# Patient Record
Sex: MALE | URBAN - METROPOLITAN AREA
[De-identification: names, ages, dates, MRNs, and addresses within clinical notes are randomized per-mention and may not be internally consistent; named-entity substitution may affect disease eponyms.]

---

## 2020-08-06 ENCOUNTER — OFFICE VISIT (OUTPATIENT)
Dept: URBAN - METROPOLITAN AREA CLINIC 19 | Facility: CLINIC | Age: 50
End: 2020-08-06
Payer: COMMERCIAL

## 2020-08-06 DIAGNOSIS — K85.00 IDIOPATHIC ACUTE PANCREATITIS WITHOUT INFECTION OR NECROSIS: ICD-10-CM

## 2020-08-06 DIAGNOSIS — K62.5 RECTAL BLEEDING: ICD-10-CM

## 2020-08-06 PROCEDURE — 99244 OFF/OP CNSLTJ NEW/EST MOD 40: CPT | Performed by: INTERNAL MEDICINE

## 2020-08-06 PROCEDURE — 99204 OFFICE O/P NEW MOD 45 MIN: CPT | Performed by: INTERNAL MEDICINE

## 2020-08-07 LAB
A/G RATIO: 1.5
ALBUMIN: 4.4
ALKALINE PHOSPHATASE: 86
ALT (SGPT): 124
AST (SGOT): 59
BILIRUBIN, TOTAL: 0.4
BUN/CREATININE RATIO: 12
BUN: 12
CALCIUM: 9.8
CARBON DIOXIDE, TOTAL: 20
CHLORIDE: 97
CHOLESTEROL, TOTAL: 294
COMMENT:: (no result)
CREATININE: 0.99
EGFR IF AFRICN AM: 103
EGFR IF NONAFRICN AM: 89
GLOBULIN, TOTAL: 3
GLUCOSE: 140
HDL CHOLESTEROL: 41
HEMATOCRIT: 49.1
HEMOGLOBIN: 16.5
LDL CHOLESTEROL CALC: 202
MCH: 31.7
MCHC: 33.6
MCV: 94
NRBC: (no result)
PLATELETS: 320
POTASSIUM: 4.6
PROTEIN, TOTAL: 7.4
RBC: 5.21
RDW: 12.1
SODIUM: 136
TRIGLYCERIDES: 256
VLDL CHOLESTEROL CAL: 51
WBC: 10.5

## 2020-08-21 ENCOUNTER — LAB OUTSIDE AN ENCOUNTER (OUTPATIENT)
Dept: URBAN - METROPOLITAN AREA CLINIC 19 | Facility: CLINIC | Age: 50
End: 2020-08-21

## 2020-08-25 ENCOUNTER — OFFICE VISIT (OUTPATIENT)
Dept: URBAN - METROPOLITAN AREA LAB 2 | Facility: LAB | Age: 50
End: 2020-08-25

## 2020-11-06 ENCOUNTER — OFFICE VISIT (OUTPATIENT)
Dept: URBAN - METROPOLITAN AREA SURGERY CENTER 31 | Facility: SURGERY CENTER | Age: 50
End: 2020-11-06

## 2021-01-22 ENCOUNTER — TELEPHONE ENCOUNTER (OUTPATIENT)
Dept: URBAN - METROPOLITAN AREA CLINIC 92 | Facility: CLINIC | Age: 51
End: 2021-01-22

## 2021-01-22 ENCOUNTER — CLAIMS CREATED FROM THE CLAIM WINDOW (OUTPATIENT)
Dept: URBAN - METROPOLITAN AREA CLINIC 4 | Facility: CLINIC | Age: 51
End: 2021-01-22
Payer: COMMERCIAL

## 2021-01-22 ENCOUNTER — OFFICE VISIT (OUTPATIENT)
Dept: URBAN - METROPOLITAN AREA SURGERY CENTER 31 | Facility: SURGERY CENTER | Age: 51
End: 2021-01-22
Payer: COMMERCIAL

## 2021-01-22 DIAGNOSIS — D12.4 ADENOMA OF DESCENDING COLON: ICD-10-CM

## 2021-01-22 DIAGNOSIS — D12.5 ADENOMA OF SIGMOID COLON: ICD-10-CM

## 2021-01-22 DIAGNOSIS — D12.4 BENIGN NEOPLASM OF DESCENDING COLON: ICD-10-CM

## 2021-01-22 DIAGNOSIS — D12.5 BENIGN NEOPLASM OF SIGMOID COLON: ICD-10-CM

## 2021-01-22 DIAGNOSIS — K92.1 BLACK STOOL: ICD-10-CM

## 2021-01-22 PROCEDURE — 45385 COLONOSCOPY W/LESION REMOVAL: CPT | Performed by: INTERNAL MEDICINE

## 2021-01-22 PROCEDURE — 45381 COLONOSCOPY SUBMUCOUS NJX: CPT | Performed by: INTERNAL MEDICINE

## 2021-01-22 PROCEDURE — G8907 PT DOC NO EVENTS ON DISCHARG: HCPCS | Performed by: INTERNAL MEDICINE

## 2021-01-22 PROCEDURE — 88305 TISSUE EXAM BY PATHOLOGIST: CPT | Performed by: PATHOLOGY

## 2021-04-02 ENCOUNTER — WEB ENCOUNTER (OUTPATIENT)
Dept: URBAN - METROPOLITAN AREA CLINIC 20 | Facility: CLINIC | Age: 51
End: 2021-04-02